# Patient Record
Sex: MALE | Race: WHITE | NOT HISPANIC OR LATINO | Employment: FULL TIME | URBAN - METROPOLITAN AREA
[De-identification: names, ages, dates, MRNs, and addresses within clinical notes are randomized per-mention and may not be internally consistent; named-entity substitution may affect disease eponyms.]

---

## 2018-05-09 ENCOUNTER — OFFICE VISIT (OUTPATIENT)
Dept: UROLOGY | Facility: MEDICAL CENTER | Age: 53
End: 2018-05-09
Payer: COMMERCIAL

## 2018-05-09 VITALS
BODY MASS INDEX: 34.95 KG/M2 | HEIGHT: 72 IN | WEIGHT: 258 LBS | SYSTOLIC BLOOD PRESSURE: 124 MMHG | DIASTOLIC BLOOD PRESSURE: 82 MMHG

## 2018-05-09 DIAGNOSIS — R31.29 OTHER MICROSCOPIC HEMATURIA: ICD-10-CM

## 2018-05-09 DIAGNOSIS — R39.89 BLADDER PAIN: Primary | ICD-10-CM

## 2018-05-09 DIAGNOSIS — I86.1 LEFT VARICOCELE: ICD-10-CM

## 2018-05-09 LAB
SL AMB  POCT GLUCOSE, UA: ABNORMAL
SL AMB LEUKOCYTE ESTERASE,UA: ABNORMAL
SL AMB POCT BILIRUBIN,UA: ABNORMAL
SL AMB POCT BLOOD,UA: ABNORMAL
SL AMB POCT CLARITY,UA: CLEAR
SL AMB POCT COLOR,UA: YELLOW
SL AMB POCT KETONES,UA: ABNORMAL
SL AMB POCT NITRITE,UA: ABNORMAL
SL AMB POCT PH,UA: 6
SL AMB POCT SPECIFIC GRAVITY,UA: 1
SL AMB POCT URINE PROTEIN: ABNORMAL
SL AMB POCT UROBILINOGEN: 0.2

## 2018-05-09 PROCEDURE — 99214 OFFICE O/P EST MOD 30 MIN: CPT | Performed by: UROLOGY

## 2018-05-09 PROCEDURE — 81003 URINALYSIS AUTO W/O SCOPE: CPT | Performed by: UROLOGY

## 2018-05-09 RX ORDER — LEVOTHYROXINE SODIUM 0.07 MG/1
TABLET ORAL
COMMUNITY
Start: 2018-04-11

## 2018-05-09 RX ORDER — LISINOPRIL 30 MG/1
TABLET ORAL
COMMUNITY
Start: 2018-04-14

## 2018-05-09 RX ORDER — SIMVASTATIN 20 MG
20 TABLET ORAL
COMMUNITY
Start: 2015-09-14 | End: 2019-01-16

## 2018-05-09 RX ORDER — CHLORAL HYDRATE 500 MG
1000 CAPSULE ORAL
COMMUNITY
Start: 2008-08-22

## 2018-05-09 NOTE — LETTER
May 9, 2018     [address]  [address] [facility] [id number]    Patient: Sonya Villafuerte   YOB: 1965   Date of Visit: 5/9/2018       Dear Dr Lacy Yadav: Thank you for referring Riddhi Kohler to me for evaluation  Below are my notes for this consultation  If you have questions, please do not hesitate to call me  I look forward to following your patient along with you  Sincerely,        Jong Graham MD        CC: No Recipients  Jong Graham MD  5/9/2018  3:25 PM  Sign at close encounter  Assessment/Plan:    Bladder pain  Bladder discomfort occurs with a full bladder and may be related to activity and back discomfort  Previous workup included a noncontrast CT scan which was negative and a urine cytology which was benign  The patient did not return for cystoscopy  I now recommended that we repeat the CT scan  I also recommend he return for cystoscopy  The procedure was described  Left varicocele  Left varicocele was noted  I am unsure whether this is related to the patient's discomfort  We will continue to follow for now  Other microscopic hematuria  Causes and workup discussed  Diagnoses and all orders for this visit:    Bladder pain  -     POCT urine dip auto non-scope  -     CT abdomen pelvis w wo contrast; Future  -     Cystoscopy; Future    Other microscopic hematuria  -     CT abdomen pelvis w wo contrast; Future  -     Cystoscopy; Future    Left varicocele    Other orders  -     levothyroxine 75 mcg tablet;   -     lisinopril (ZESTRIL) 30 mg tablet;   -     Omega-3 Fatty Acids (FISH OIL) 1,000 mg; Take 1,000 mg by mouth  -     simvastatin (ZOCOR) 20 mg tablet; Take 20 mg by mouth          Subjective:      Patient ID: Sonya Villafuerte is a 47 y o  male  71-year-old male were returns to the office with complaints of intermittent bladder discomfort as well as left testicular discomfort    The symptoms occur every few few months and may persist for a month at a time  His symptoms do seem to be related to back discomfort as well  He feels discomfort begins in the back and then radiates to the left testicle and groin  He voids with a good stream and empties his bladder well  There is no gross hematuria, dysuria or symptoms of infection  He has no irritative voiding symptoms  The bladder discomfort occurs at night  when his bladder is full  There is no fever or flank pain  He was last seen for similar symptoms in December 2016  At that time he felt the symptoms were improving  The following portions of the patient's history were reviewed and updated as appropriate: allergies, current medications, past family history, past medical history, past social history, past surgical history and problem list     Review of Systems   Constitutional: Negative for chills, diaphoresis, fatigue and fever  HENT: Negative  Eyes: Negative  Respiratory: Negative  Asthma   Cardiovascular: Negative  Gastrointestinal: Negative  Endocrine: Negative  Musculoskeletal: Negative  Skin: Negative  Allergic/Immunologic: Negative  Neurological: Negative  Hematological: Negative  Psychiatric/Behavioral: Negative  Objective:      /82 (BP Location: Left arm, Patient Position: Sitting)   Ht 6' (1 829 m)   Wt 117 kg (258 lb)   BMI 34 99 kg/m²           Physical Exam   Constitutional: He is oriented to person, place, and time  He appears well-developed and well-nourished  HENT:   Head: Normocephalic and atraumatic  Eyes: Conjunctivae are normal    Neck: Neck supple  Cardiovascular: Normal rate  Pulmonary/Chest: Effort normal    Abdominal: Soft  Bowel sounds are normal  He exhibits no distension and no mass  There is no tenderness  There is no rebound, no guarding and no CVA tenderness  Hernia confirmed negative in the right inguinal area and confirmed negative in the left inguinal area  Small umbilical hernia  No inguinal hernia noted   Genitourinary: Rectum normal, testes normal and penis normal  Prostate is enlarged  Prostate is not tender  Right testis shows no mass  Left testis shows no mass  No phimosis or hypospadias  Genitourinary Comments: Left varicocele  Prostate 1+ enlarged and palpably benign  Prostate is nontender and free of induration  Musculoskeletal: He exhibits no edema  Neurological: He is alert and oriented to person, place, and time  Skin: Skin is warm and dry  Psychiatric: He has a normal mood and affect  His behavior is normal  Judgment and thought content normal    Vitals reviewed

## 2018-05-09 NOTE — ASSESSMENT & PLAN NOTE
Left varicocele was noted  I am unsure whether this is related to the patient's discomfort  We will continue to follow for now

## 2018-05-09 NOTE — PROGRESS NOTES
Assessment/Plan:    Bladder pain  Bladder discomfort occurs with a full bladder and may be related to activity and back discomfort  Previous workup included a noncontrast CT scan which was negative and a urine cytology which was benign  The patient did not return for cystoscopy  I now recommended that we repeat the CT scan  I also recommend he return for cystoscopy  The procedure was described  Left varicocele  Left varicocele was noted  I am unsure whether this is related to the patient's discomfort  We will continue to follow for now  Other microscopic hematuria  Causes and workup discussed  Diagnoses and all orders for this visit:    Bladder pain  -     POCT urine dip auto non-scope  -     CT abdomen pelvis w wo contrast; Future  -     Cystoscopy; Future    Other microscopic hematuria  -     CT abdomen pelvis w wo contrast; Future  -     Cystoscopy; Future    Left varicocele    Other orders  -     levothyroxine 75 mcg tablet;   -     lisinopril (ZESTRIL) 30 mg tablet;   -     Omega-3 Fatty Acids (FISH OIL) 1,000 mg; Take 1,000 mg by mouth  -     simvastatin (ZOCOR) 20 mg tablet; Take 20 mg by mouth          Subjective:      Patient ID: Mihai Ma is a 47 y o  male  65-year-old male were returns to the office with complaints of intermittent bladder discomfort as well as left testicular discomfort  The symptoms occur every few few months and may persist for a month at a time  His symptoms do seem to be related to back discomfort as well  He feels discomfort begins in the back and then radiates to the left testicle and groin  He voids with a good stream and empties his bladder well  There is no gross hematuria, dysuria or symptoms of infection  He has no irritative voiding symptoms  The bladder discomfort occurs at night  when his bladder is full  There is no fever or flank pain  He was last seen for similar symptoms in December 2016    At that time he felt the symptoms were improving  The following portions of the patient's history were reviewed and updated as appropriate: allergies, current medications, past family history, past medical history, past social history, past surgical history and problem list     Review of Systems   Constitutional: Negative for chills, diaphoresis, fatigue and fever  HENT: Negative  Eyes: Negative  Respiratory: Negative  Asthma   Cardiovascular: Negative  Gastrointestinal: Negative  Endocrine: Negative  Musculoskeletal: Negative  Skin: Negative  Allergic/Immunologic: Negative  Neurological: Negative  Hematological: Negative  Psychiatric/Behavioral: Negative  Objective:      /82 (BP Location: Left arm, Patient Position: Sitting)   Ht 6' (1 829 m)   Wt 117 kg (258 lb)   BMI 34 99 kg/m²          Physical Exam   Constitutional: He is oriented to person, place, and time  He appears well-developed and well-nourished  HENT:   Head: Normocephalic and atraumatic  Eyes: Conjunctivae are normal    Neck: Neck supple  Cardiovascular: Normal rate  Pulmonary/Chest: Effort normal    Abdominal: Soft  Bowel sounds are normal  He exhibits no distension and no mass  There is no tenderness  There is no rebound, no guarding and no CVA tenderness  Hernia confirmed negative in the right inguinal area and confirmed negative in the left inguinal area  Small umbilical hernia  No inguinal hernia noted   Genitourinary: Rectum normal, testes normal and penis normal  Prostate is enlarged  Prostate is not tender  Right testis shows no mass  Left testis shows no mass  No phimosis or hypospadias  Genitourinary Comments: Left varicocele  Prostate 1+ enlarged and palpably benign  Prostate is nontender and free of induration  Musculoskeletal: He exhibits no edema  Neurological: He is alert and oriented to person, place, and time  Skin: Skin is warm and dry  Psychiatric: He has a normal mood and affect   His behavior is normal  Judgment and thought content normal    Vitals reviewed

## 2018-05-09 NOTE — ASSESSMENT & PLAN NOTE
Bladder discomfort occurs with a full bladder and may be related to activity and back discomfort  Previous workup included a noncontrast CT scan which was negative and a urine cytology which was benign  The patient did not return for cystoscopy  I now recommended that we repeat the CT scan  I also recommend he return for cystoscopy  The procedure was described

## 2018-05-21 ENCOUNTER — HOSPITAL ENCOUNTER (OUTPATIENT)
Dept: RADIOLOGY | Age: 53
Discharge: HOME | End: 2018-05-21
Payer: COMMERCIAL

## 2018-05-21 DIAGNOSIS — R31.29 OTHER MICROSCOPIC HEMATURIA: ICD-10-CM

## 2018-05-21 DIAGNOSIS — R39.89 BLADDER PAIN: ICD-10-CM

## 2018-05-21 PROCEDURE — 74178 CT ABD&PLV WO CNTR FLWD CNTR: CPT

## 2018-05-21 RX ADMIN — IOHEXOL 100 ML: 350 INJECTION, SOLUTION INTRAVENOUS at 14:01

## 2018-06-13 ENCOUNTER — PROCEDURE VISIT (OUTPATIENT)
Dept: UROLOGY | Facility: MEDICAL CENTER | Age: 53
End: 2018-06-13
Payer: COMMERCIAL

## 2018-06-13 VITALS
SYSTOLIC BLOOD PRESSURE: 138 MMHG | DIASTOLIC BLOOD PRESSURE: 72 MMHG | HEIGHT: 72 IN | BODY MASS INDEX: 34.95 KG/M2 | WEIGHT: 258 LBS

## 2018-06-13 DIAGNOSIS — N40.1 BENIGN PROSTATIC HYPERPLASIA WITH URINARY OBSTRUCTION: ICD-10-CM

## 2018-06-13 DIAGNOSIS — R31.29 OTHER MICROSCOPIC HEMATURIA: ICD-10-CM

## 2018-06-13 DIAGNOSIS — N13.8 BENIGN PROSTATIC HYPERPLASIA WITH URINARY OBSTRUCTION: ICD-10-CM

## 2018-06-13 DIAGNOSIS — R39.89 BLADDER PAIN: Primary | ICD-10-CM

## 2018-06-13 LAB
SL AMB  POCT GLUCOSE, UA: ABNORMAL
SL AMB LEUKOCYTE ESTERASE,UA: ABNORMAL
SL AMB POCT BILIRUBIN,UA: ABNORMAL
SL AMB POCT BLOOD,UA: ABNORMAL
SL AMB POCT CLARITY,UA: CLEAR
SL AMB POCT COLOR,UA: YELLOW
SL AMB POCT KETONES,UA: ABNORMAL
SL AMB POCT NITRITE,UA: ABNORMAL
SL AMB POCT PH,UA: 6.5
SL AMB POCT SPECIFIC GRAVITY,UA: 1.01
SL AMB POCT URINE PROTEIN: ABNORMAL
SL AMB POCT UROBILINOGEN: 0.2

## 2018-06-13 PROCEDURE — 99213 OFFICE O/P EST LOW 20 MIN: CPT | Performed by: UROLOGY

## 2018-06-13 PROCEDURE — 52000 CYSTOURETHROSCOPY: CPT | Performed by: UROLOGY

## 2018-06-13 PROCEDURE — 81003 URINALYSIS AUTO W/O SCOPE: CPT | Performed by: UROLOGY

## 2018-06-13 NOTE — ASSESSMENT & PLAN NOTE
Negative urologic workup to date including CT scan, urine cytology and cystoscopy  CT does show umbilical and bilateral inguinal hernias  Perhaps the patient's pain is related to the fat containing inguinal hernia  The patient was informed of findings and we did discuss general surgery referral   At this point he is happy that there was no evidence of malignancy and he is content to pursue observation  He will call for a general surgery referral if symptoms worsen

## 2018-06-13 NOTE — ASSESSMENT & PLAN NOTE
Patient is satisfied with his voiding pattern  PSA 0 71 in February 2018  We will plan to repeat the PSA in February 2019

## 2018-06-13 NOTE — PROGRESS NOTES
Assessment/Plan:    Bladder pain  Negative urologic workup to date including CT scan, urine cytology and cystoscopy  CT does show umbilical and bilateral inguinal hernias  Perhaps the patient's pain is related to the fat containing inguinal hernia  The patient was informed of findings and we did discuss general surgery referral   At this point he is happy that there was no evidence of malignancy and he is content to pursue observation  He will call for a general surgery referral if symptoms worsen  Benign prostatic hyperplasia with urinary obstruction  Patient is satisfied with his voiding pattern  PSA 0 71 in February 2018  We will plan to repeat the PSA in February 2019  Other microscopic hematuria  Benign evaluation to date  We will continue to follow up       Diagnoses and all orders for this visit:    Bladder pain  -     POCT urine dip auto non-scope    Other microscopic hematuria  -     POCT urine dip auto non-scope    Benign prostatic hyperplasia with urinary obstruction  -     PSA Total, Diagnostic; Future    Other orders  -     Cystoscopy          Subjective:      Patient ID: Maribeth Summers is a 47 y o  male  62-year-old male returns to the office today for follow-up  He has noted bladder and left groin discomfort  There is no gross hematuria, dysuria or symptoms of infection  Microscopic hematuria has been noted  The symptoms are described as bothersome but not severe  He has no fever or flank pain  He is satisfied with his voiding pattern  The bladder discomfort is worse at night and with a full bladder  The following portions of the patient's history were reviewed and updated as appropriate: allergies, current medications, past family history, past medical history, past social history, past surgical history and problem list     Review of Systems   Constitutional: Negative for chills, diaphoresis, fatigue and fever  HENT: Negative  Eyes: Negative      Respiratory: Negative  Cardiovascular: Negative  Endocrine: Negative  Musculoskeletal: Negative  Skin: Negative  Allergic/Immunologic: Negative  Neurological: Negative  Hematological: Negative  Psychiatric/Behavioral: Negative  Objective:      /72 (BP Location: Left arm, Patient Position: Sitting)   Ht 6' (1 829 m)   Wt 117 kg (258 lb)   BMI 34 99 kg/m²          Physical Exam   Constitutional: He is oriented to person, place, and time  He appears well-developed and well-nourished  HENT:   Head: Normocephalic and atraumatic  Eyes: Conjunctivae are normal    Neck: Neck supple  Cardiovascular: Normal rate  Pulmonary/Chest: Effort normal    Abdominal: He exhibits no distension and no mass  There is no tenderness  There is no rebound and no guarding  No hernia   Genitourinary: Penis normal    Genitourinary Comments: Testes descended and normal to palpation   Neurological: He is alert and oriented to person, place, and time  Skin: Skin is warm and dry  Psychiatric: He has a normal mood and affect  His behavior is normal  Judgment and thought content normal      Cystoscopy  Date/Time: 6/13/2018 3:19 PM  Performed by: Angelica Meredith  Authorized by: Angelica Meredith     Procedure details: cystoscopy    Patient tolerance: Patient tolerated the procedure well with no immediate complications    Additional Procedure Details: Cystoscopy Procedure Note        Pre-operative Diagnosis: Bladder pain    Post-operative Diagnosis:  Normal appearing bladder  Prostatic obstruction      Procedure Details   The risks, benefits, complications, treatment options, and expected outcomes were discussed with the patient  The patient concurred with the proposed plan, giving informed consent  Cystoscopy was performed today under local anesthesia, using sterile technique  The patient was placed in the supine position, prepped and draped in the usual sterile fashion   A 15 Irish flexible cystoscope was used to inspect both the urethra and bladder  Findings:  Normal urethra, predominantly bilobar prostatic hypertrophy approximately 15-20 g causing complete outflow obstruction  The ureteral orifices are normal   The bladder is mildly trabeculated  There are no stones tumors or diverticula  No mucosal lesions are evident  Discussion:  Upper tracts are normal by CT scan  Urine cytology has been benign in the past   The prostatic enlargement is the likely cause of blood noted on dipstick

## 2018-06-13 NOTE — PATIENT INSTRUCTIONS
